# Patient Record
Sex: FEMALE | Race: BLACK OR AFRICAN AMERICAN | Employment: FULL TIME | URBAN - METROPOLITAN AREA
[De-identification: names, ages, dates, MRNs, and addresses within clinical notes are randomized per-mention and may not be internally consistent; named-entity substitution may affect disease eponyms.]

---

## 2024-04-24 ENCOUNTER — APPOINTMENT (OUTPATIENT)
Dept: GENERAL RADIOLOGY | Facility: HOSPITAL | Age: 39
End: 2024-04-24
Attending: EMERGENCY MEDICINE

## 2024-04-24 ENCOUNTER — APPOINTMENT (OUTPATIENT)
Dept: ULTRASOUND IMAGING | Facility: HOSPITAL | Age: 39
End: 2024-04-24
Attending: EMERGENCY MEDICINE

## 2024-04-24 ENCOUNTER — HOSPITAL ENCOUNTER (EMERGENCY)
Facility: HOSPITAL | Age: 39
Discharge: HOME | End: 2024-04-24
Attending: EMERGENCY MEDICINE

## 2024-04-24 VITALS
BODY MASS INDEX: 34.06 KG/M2 | WEIGHT: 217 LBS | RESPIRATION RATE: 20 BRPM | TEMPERATURE: 99 F | SYSTOLIC BLOOD PRESSURE: 135 MMHG | DIASTOLIC BLOOD PRESSURE: 90 MMHG | HEART RATE: 62 BPM | HEIGHT: 67 IN | OXYGEN SATURATION: 99 %

## 2024-04-24 DIAGNOSIS — M54.31 RIGHT SCIATIC NERVE PAIN: Primary | ICD-10-CM

## 2024-04-24 LAB
B-HCG UR QL: NEGATIVE
BILIRUB UR QL: NEGATIVE
CLARITY UR: CLEAR
GLUCOSE UR-MCNC: NORMAL MG/DL
KETONES UR-MCNC: NEGATIVE MG/DL
LEUKOCYTE ESTERASE UR QL STRIP.AUTO: NEGATIVE
NITRITE UR QL STRIP.AUTO: NEGATIVE
PH UR: 6.5 [PH] (ref 5–8)
PROT UR-MCNC: NEGATIVE MG/DL
SP GR UR STRIP: 1.01 (ref 1–1.03)
UROBILINOGEN UR STRIP-ACNC: NORMAL

## 2024-04-24 PROCEDURE — 81001 URINALYSIS AUTO W/SCOPE: CPT | Performed by: EMERGENCY MEDICINE

## 2024-04-24 PROCEDURE — 72100 X-RAY EXAM L-S SPINE 2/3 VWS: CPT | Performed by: EMERGENCY MEDICINE

## 2024-04-24 PROCEDURE — 99284 EMERGENCY DEPT VISIT MOD MDM: CPT

## 2024-04-24 PROCEDURE — 96372 THER/PROPH/DIAG INJ SC/IM: CPT

## 2024-04-24 PROCEDURE — 81025 URINE PREGNANCY TEST: CPT

## 2024-04-24 PROCEDURE — 93971 EXTREMITY STUDY: CPT | Performed by: EMERGENCY MEDICINE

## 2024-04-24 RX ORDER — CYCLOBENZAPRINE HCL 10 MG
5 TABLET ORAL NIGHTLY
Qty: 2 TABLET | Refills: 0 | Status: SHIPPED | OUTPATIENT
Start: 2024-04-24 | End: 2024-04-28

## 2024-04-24 RX ORDER — KETOROLAC TROMETHAMINE 30 MG/ML
30 INJECTION, SOLUTION INTRAMUSCULAR; INTRAVENOUS ONCE
Status: COMPLETED | OUTPATIENT
Start: 2024-04-24 | End: 2024-04-24

## 2024-04-24 NOTE — ED INITIAL ASSESSMENT (HPI)
Pt c/o atraumatic right lower back/right hip pain x \"months\". Pt reports pain radiates to right leg.

## 2024-04-24 NOTE — ED PROVIDER NOTES
Patient Seen in: [facility] Emergency Department    History     Chief Complaint   Patient presents with    Leg Pain       HPI    39-year-old female here stating that she has had radiating pain down her right leg starting at the buttock in addition to swelling associated with the right thigh stating that her right thigh appears more swollen when compared to her left thigh.  She denies any trauma or any focal weakness or numbness or superficial skin changes.    Denies any recent immobilization, surgery, unilateral lower extremity edema, known cancer history, or recent travel. No hx of DVT or PE.       History reviewed. History reviewed. No pertinent past medical history.    History reviewed. History reviewed. No pertinent surgical history.      Medications :  (Not in a hospital admission)       No family history on file.    Smoking Status:   Social History     Socioeconomic History    Marital status: Single   Tobacco Use    Smoking status: Never   Vaping Use    Vaping status: Never Used   Substance and Sexual Activity    Alcohol use: Never    Drug use: Never       Constitutional and vital signs reviewed.      Social History and Family History elements reviewed from today, pertinent positives to the presenting problem noted.    Physical Exam     ED Triage Vitals [04/24/24 0845]   BP (!) 167/93   Pulse 79   Resp 20   Temp 98.7 °F (37.1 °C)   Temp src Temporal   SpO2 100 %   O2 Device None (Room air)       All measures to prevent infection transmission during my interaction with the patient were taken. The patient was already wearing a droplet mask on my arrival to the room. Personal protective equipment was worn throughout the duration of the exam.  Handwashing was performed prior to and after the exam.  Stethoscope and any equipment used during my examination was cleaned with super sani-cloth germicidal wipes following the exam.     Physical Exam    General: NAD  Head: Normocephalic and  atraumatic.  Mouth/Throat/Ears/Nose: Oropharynx is clear    Eyes: Conjunctivae and EOM are normal.   Neck: Normal range of motion. Supple.   Cardiovascular: Normal rate, regular rhythm, normal heart sounds.  Respiratory/Chest: Clear and equal bilaterally. Exhibits no tenderness.  Gastrointestinal: Soft, non-tender, non-distended. Bowel sounds are normal.   Musculoskeletal:No swelling or deformity.   Neurological: Alert and appropriate. No focal deficits. AOx4  CN II-XII grossly intact  5/5 strength: Left  strength, deltoid abduction, achilles, patella  5/5 strength: Right  strength, deltoid abduction, achilles, patella   SILT to bilateral upper and lower extremities   normal gait   Skin: Skin is warm and dry. No pallor.  Psychiatric: Has a normal mood and affect.      ED Course        Labs Reviewed   URINALYSIS, ROUTINE - Abnormal; Notable for the following components:       Result Value    Blood Urine Trace (*)     Squamous Epi. Cells Few (*)     All other components within normal limits   POCT PREGNANCY URINE - Normal       As Interpreted by me    Imaging Results Available and Reviewed while in ED: XR LUMBAR SPINE (MIN 2 VIEWS) (CPT=72100)    Result Date: 4/24/2024  CONCLUSION:   No acute fracture or malalignment lumbar spine.  Mild lumbar spine degenerative change.    Dictated by (CST): Orlando Lowe MD on 4/24/2024 at 10:40 AM     Finalized by (CST): Orlando Lowe MD on 4/24/2024 at 10:40 AM          US VENOUS DOPPLER LEG RIGHT - DIAG IMG (CPT=93971)    Result Date: 4/24/2024  CONCLUSION: Normal examination.     Dictated by (CST): Maged Delvalle MD on 4/24/2024 at 10:10 AM     Finalized by (CST): Maged Delvalle MD on 4/24/2024 at 10:11 AM         ED Medications Administered:   Medications   ketorolac (Toradol) 30 MG/ML injection 30 mg (30 mg Intramuscular Given 4/24/24 0945)         MDM     Vitals:    04/24/24 0845 04/24/24 1100   BP: (!) 167/93 135/90   Pulse: 79 62   Resp: 20    Temp: 98.7 °F  (37.1 °C)    TempSrc: Temporal    SpO2: 100% 99%   Weight: 98.4 kg    Height: 170.2 cm (5' 7\")      *I personally reviewed and interpreted all ED vitals.    Pulse Ox: 100%, Room air, Normal       Medical Decision Making      Differential Diagnosis/ Diagnostic Considerations: Sciatica, lumbar radiculopathy, DVT    Complicating Factors: The patient already has does not have a problem list on file. to contribute to the complexity of this ED evaluation.    I reviewed prior chart records including ED visit note from November 13, 2023.  Patient is here with sciatica.  Doppler ultrasound of the right lower extremity negative for DVT.  X-ray of the lumbar spine unremarkable for acute findings on my interpretation.  Pregnancy test negative on my interpretation of the lab work.  She feels improved after supportive care.  Provided physiatry referral    Discharged in stable condition.  Patient is comfortable with the plan.    Prescriptions: Recommend over-the-counter ibuprofen for pain.  Flexeril      Disposition and Plan     Clinical Impression:  1. Right sciatic nerve pain        Disposition:  Discharge    Follow-up:  Eddie Paredes Y, DO  [address] [address]  [phone]    Schedule an appointment as soon as possible for a visit in 1 day(s)        Medications Prescribed:  Discharge Medication List as of 4/24/2024 11:08 AM        START taking these medications    Details   cyclobenzaprine 10 MG Oral Tab Take 0.5 tablets (5 mg total) by mouth nightly for 4 days., Normal, Disp-2 tablet, R-0